# Patient Record
Sex: MALE
[De-identification: names, ages, dates, MRNs, and addresses within clinical notes are randomized per-mention and may not be internally consistent; named-entity substitution may affect disease eponyms.]

---

## 2017-10-09 ENCOUNTER — HOSPITAL ENCOUNTER (EMERGENCY)
Dept: HOSPITAL 31 - C.ER | Age: 32
Discharge: HOME | End: 2017-10-09
Payer: SELF-PAY

## 2017-10-09 VITALS — RESPIRATION RATE: 16 BRPM

## 2017-10-09 VITALS — HEART RATE: 96 BPM | SYSTOLIC BLOOD PRESSURE: 100 MMHG | DIASTOLIC BLOOD PRESSURE: 64 MMHG

## 2017-10-09 VITALS — TEMPERATURE: 97.4 F | OXYGEN SATURATION: 99 %

## 2017-10-09 VITALS — BODY MASS INDEX: 27.5 KG/M2

## 2017-10-09 DIAGNOSIS — S20.212A: Primary | ICD-10-CM
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
## 2017-10-09 LAB
ALBUMIN/GLOB SERPL: 1.3 {RATIO} (ref 1–2.1)
ALP SERPL-CCNC: 91 U/L (ref 38–126)
ALT SERPL-CCNC: 42 U/L (ref 21–72)
AST SERPL-CCNC: 35 U/L (ref 17–59)
BASOPHILS # BLD AUTO: 0.2 K/UL (ref 0–0.2)
BASOPHILS NFR BLD: 1.7 % (ref 0–2)
BILIRUB SERPL-MCNC: 0.9 MG/DL (ref 0.2–1.3)
BUN SERPL-MCNC: 9 MG/DL (ref 9–20)
CALCIUM SERPL-MCNC: 9.5 MG/DL (ref 8.6–10.4)
CHLORIDE SERPL-SCNC: 106 MMOL/L (ref 98–107)
CO2 SERPL-SCNC: 20 MMOL/L (ref 22–30)
EOSINOPHIL # BLD AUTO: 0 K/UL (ref 0–0.7)
EOSINOPHIL NFR BLD: 0.3 % (ref 0–4)
EOSINOPHIL NFR BLD: 1 % (ref 0–4)
ERYTHROCYTE [DISTWIDTH] IN BLOOD BY AUTOMATED COUNT: 13.3 % (ref 11.5–14.5)
ETHANOL SERPL-MCNC: 231 MG/DL (ref 0–10)
GLOBULIN SER-MCNC: 3.8 GM/DL (ref 2.2–3.9)
GLUCOSE SERPL-MCNC: 101 MG/DL (ref 75–110)
HCT VFR BLD CALC: 45.5 % (ref 35–51)
LYMPHOCYTES # BLD AUTO: 0.9 K/UL (ref 1–4.3)
LYMPHOCYTES NFR BLD AUTO: 8.2 % (ref 20–40)
MCH RBC QN AUTO: 29.6 PG (ref 27–31)
MCHC RBC AUTO-ENTMCNC: 34.8 G/DL (ref 33–37)
MCV RBC AUTO: 85.1 FL (ref 80–94)
MONOCYTES # BLD: 0.5 K/UL (ref 0–0.8)
MONOCYTES NFR BLD: 4.6 % (ref 0–10)
NEUTROPHILS NFR BLD AUTO: 84 % (ref 50–75)
NRBC BLD AUTO-RTO: 0.1 % (ref 0–2)
PLATELET # BLD: 180 K/UL (ref 130–400)
PMV BLD AUTO: 7.8 FL (ref 7.2–11.7)
POTASSIUM SERPL-SCNC: 3.2 MMOL/L (ref 3.6–5.2)
PROT SERPL-MCNC: 8.6 G/DL (ref 6.3–8.3)
SODIUM SERPL-SCNC: 141 MMOL/L (ref 132–148)
TOTAL CELLS COUNTED BLD: 100
WBC # BLD AUTO: 11.3 K/UL (ref 4.8–10.8)

## 2017-10-09 PROCEDURE — 74177 CT ABD & PELVIS W/CONTRAST: CPT

## 2017-10-09 PROCEDURE — 96374 THER/PROPH/DIAG INJ IV PUSH: CPT

## 2017-10-09 PROCEDURE — 96361 HYDRATE IV INFUSION ADD-ON: CPT

## 2017-10-09 PROCEDURE — 85025 COMPLETE CBC W/AUTO DIFF WBC: CPT

## 2017-10-09 PROCEDURE — 99284 EMERGENCY DEPT VISIT MOD MDM: CPT

## 2017-10-09 PROCEDURE — 80053 COMPREHEN METABOLIC PANEL: CPT

## 2017-10-09 PROCEDURE — 83690 ASSAY OF LIPASE: CPT

## 2017-10-09 NOTE — C.PDOC
History Of Present Illness





33 y/o male c/o LUQ abdominal pain that began today. Patient admits to alcohol 

abuse. Has conflicting stories that he was brought in by EMS for sleeping 

outside or called EMS for abdominal pain. Denies fever, or chills. No chest 

pain or SOB.





Time Seen by Provider: 10/09/17 02:58


Chief Complaint (Nursing): Substance Abuse


History Per: Patient


History/Exam Limitations: no limitations


Onset/Duration Of Symptoms: Hrs


Current Symptoms Are (Timing): Still Present


Suicide/Self Injury Attempted (Context): None


Modifying Factor(s): Alcohol


Severity: Mild


Associated Symptoms: denies: Suicidal Thoughts, Suicidal Plan





Past Medical History


Reviewed: Historical Data, Nursing Documentation, Vital Signs


Vital Signs: 


 Last Vital Signs











Temp  97.4 F L  10/09/17 05:35


 


Pulse  81   10/09/17 05:35


 


Resp  16   10/09/17 05:35


 


BP  95/56 L  10/09/17 05:35


 


Pulse Ox  99   10/09/17 05:59














- Medical History


PMH: Hypercholesterolemia


Family History: States: Unknown Family Hx





- Social History


Hx Alcohol Use: Yes


Hx Substance Use: No





Review Of Systems


Except As Marked, All Systems Reviewed And Found Negative.


Constitutional: Positive for: Other (Alcohol intoxicated).  Negative for: Fever

, Chills


Cardiovascular: Negative for: Chest Pain


Respiratory: Negative for: Shortness of Breath


Gastrointestinal: Positive for: Abdominal Pain


Psych: Negative for: Suicidal ideation, Other (Homicidal ideation)





Physical Exam





- Physical Exam


Appears: Non-toxic, No Acute Distress, Other (ETOH intoxicated, (+) AOB. No 

signs of trauma)


Skin: Warm, Dry


Head: Atraumatic, Normacephalic


Chest: Symmetrical


Cardiovascular: Rhythm Regular, No Murmur


Respiratory: Normal Breath Sounds, No Rales, No Rhonchi, No Wheezing


Gastrointestinal/Abdominal: Soft, Tenderness (LUQ, left lower ribs, 

midclavicular line)


Neurological/Psych: Oriented x3





ED Course And Treatment





- Laboratory Results


Result Diagrams: 


 10/09/17 03:17





 10/09/17 03:17


Lab Interpretation: Abnormal (ETOH 231 H, mild elev leukocytes)


O2 Sat by Pulse Oximetry: 99 (RA)


Pulse Ox Interpretation: Normal





- CT Scan/US


  ** abd/pelvis with IV


Other Rad Studies (CT/US): Interpreted By Me, Read By Radiologist, Radiology 

Report Reviewed (neg)


CT/US Interpretation: IMPRESSION:  No acute findings.


Progress Note: IVF/toradol IV


Reevaluation Time: 05:57


Reassessment Condition: Improved (sleeping comfortably)





Medical Decision Making


Medical Decision Making: 





initially concerned for intoxicated/obtunded pt on street with LUQ pain and 

tenderness for ? splenic injury, but CT abd/pelvis normal 


tenderness is more lower L rib/costochondral


NSAIDS and ice therapy educated.





Disposition


Doctor Will See Patient In The: Office


Counseled Patient/Family Regarding: Studies Performed, Diagnosis





- Disposition


Referrals: 


Alcoholics Anonymous [Outside]


[facility] [Outside]


[facility] [Outside]


[facility] [Outside]


Price Sepulveda MD [Staff Provider] - 


Disposition: HOME/ ROUTINE


Disposition Time: 05:58


Condition: GOOD


Additional Instructions: 


sigue con hielo encima del las claudette doloridos.


Ibuprofeno 400-600 mg cada 6 horas mati necessario





Genna de abusar el alcohol





Instructions:  Costochondritis (ED)


Forms:  [facility] (English)


Print Language: Divehi





- Clinical Impression


Clinical Impression: 


 Contusion of rib on left side








- Scribe Statement


The provider has reviewed the documentation as recorded by the Scribe





Nalini browning





All medical record entries made by the Scribe were at my direction and 

personally dictated by me. I have reviewed the chart and agree that the record 

accurately reflects my personal performance of the history, physical exam, 

medical decision making, and the department course for this patient. I have 

also personally directed, reviewed, and agree with the discharge instructions 

and disposition.

## 2018-08-28 ENCOUNTER — HOSPITAL ENCOUNTER (EMERGENCY)
Dept: HOSPITAL 31 - C.ER | Age: 33
LOS: 1 days | Discharge: HOME | End: 2018-08-29
Payer: SELF-PAY

## 2018-08-28 VITALS — BODY MASS INDEX: 27.5 KG/M2
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
## 2018-08-28 DIAGNOSIS — F10.10: Primary | ICD-10-CM

## 2018-08-28 LAB
ALBUMIN SERPL-MCNC: 5.3 G/DL (ref 3.5–5)
ALBUMIN/GLOB SERPL: 1.7 {RATIO} (ref 1–2.1)
ALT SERPL-CCNC: 68 U/L (ref 21–72)
ARTERIAL BLOOD GAS HEMOGLOBIN: 13.9 G/DL (ref 11.7–17.4)
ARTERIAL BLOOD GAS O2 SAT: 96.5 % (ref 95–98)
ARTERIAL BLOOD GAS PCO2: 35 MM/HG (ref 35–45)
ARTERIAL BLOOD GAS TCO2: 24.3 MMOL/L (ref 22–28)
ARTERIAL PATENCY WRIST A: (no result)
AST SERPL-CCNC: 54 U/L (ref 17–59)
BASOPHILS # BLD AUTO: 0.1 K/UL (ref 0–0.2)
BASOPHILS NFR BLD: 0.6 % (ref 0–2)
BUN SERPL-MCNC: 11 MG/DL (ref 9–20)
CALCIUM SERPL-MCNC: 9.5 MG/DL (ref 8.6–10.4)
EOSINOPHIL # BLD AUTO: 0 K/UL (ref 0–0.7)
EOSINOPHIL NFR BLD: 0.1 % (ref 0–4)
ERYTHROCYTE [DISTWIDTH] IN BLOOD BY AUTOMATED COUNT: 13.2 % (ref 11.5–14.5)
GFR NON-AFRICAN AMERICAN: > 60
HCO3 BLDA-SCNC: 24.4 MMOL/L (ref 21–28)
HGB BLD-MCNC: 15.3 G/DL (ref 12–18)
INHALED O2 CONCENTRATION: 21 %
LIPASE: 59 U/L (ref 23–300)
LYMPHOCYTES # BLD AUTO: 1.9 K/UL (ref 1–4.3)
LYMPHOCYTES NFR BLD AUTO: 13.4 % (ref 20–40)
MCH RBC QN AUTO: 30.1 PG (ref 27–31)
MCHC RBC AUTO-ENTMCNC: 34.4 G/DL (ref 33–37)
MCV RBC AUTO: 87.6 FL (ref 80–94)
MONOCYTES # BLD: 0.9 K/UL (ref 0–0.8)
MONOCYTES NFR BLD: 6.5 % (ref 0–10)
NEUTROPHILS # BLD: 11 K/UL (ref 1.8–7)
NEUTROPHILS NFR BLD AUTO: 79.4 % (ref 50–75)
NRBC BLD AUTO-RTO: 0 % (ref 0–2)
PH BLDA: 7.43 [PH] (ref 7.35–7.45)
PLATELET # BLD: 237 K/UL (ref 130–400)
PMV BLD AUTO: 6.8 FL (ref 7.2–11.7)
PO2 BLDA: 74 MM/HG (ref 80–100)
RBC # BLD AUTO: 5.08 MIL/UL (ref 4.4–5.9)
WBC # BLD AUTO: 13.8 K/UL (ref 4.8–10.8)

## 2018-08-28 PROCEDURE — 80053 COMPREHEN METABOLIC PANEL: CPT

## 2018-08-28 PROCEDURE — 96375 TX/PRO/DX INJ NEW DRUG ADDON: CPT

## 2018-08-28 PROCEDURE — 96361 HYDRATE IV INFUSION ADD-ON: CPT

## 2018-08-28 PROCEDURE — 96365 THER/PROPH/DIAG IV INF INIT: CPT

## 2018-08-28 PROCEDURE — 82948 REAGENT STRIP/BLOOD GLUCOSE: CPT

## 2018-08-28 PROCEDURE — 83690 ASSAY OF LIPASE: CPT

## 2018-08-28 PROCEDURE — 99285 EMERGENCY DEPT VISIT HI MDM: CPT

## 2018-08-28 PROCEDURE — 85025 COMPLETE CBC W/AUTO DIFF WBC: CPT

## 2018-08-28 PROCEDURE — 82803 BLOOD GASES ANY COMBINATION: CPT

## 2018-08-28 PROCEDURE — 81001 URINALYSIS AUTO W/SCOPE: CPT

## 2018-08-28 NOTE — C.PDOC
History Of Present Illness





Patient seen tonite due to history of alcohol intKE FOR TE PAST 3- 4 DAYS. lAST 

ALCOHOL INTAKE WAS THIS AFTERNOON. COMPLAINING IF STIFFNESS OF BOTH HANDS.


Chief Complaint (Nursing): Substance Abuse


History Per: Patient


History/Exam Limitations: no limitations


Onset/Duration Of Symptoms: Days


Current Symptoms Are (Timing): Still Present


Suicide/Self Injury Attempted (Context): None


Modifying Factor(s): Alcohol


Severity: Mild


Pain Scale Rating Of: 2


Associated Symptoms: Agitation


Involuntary Hold By: None


Recent travel outside of the [place]: No


Additional History Per: Patient





Past Medical History


Vital Signs: 


 Last Vital Signs











Temp  98.8 F   08/28/18 21:39


 


Pulse  88   08/28/18 23:43


 


Resp  16   08/28/18 23:43


 


BP  130/74   08/28/18 23:43


 


Pulse Ox  96   08/28/18 21:59














- Medical History


PMH: Hypercholesterolemia


Other PMH: ALCOHOL ABUSE


Surgical History: No Surg Hx


Family History: States: Unknown Family Hx





- Social History


Hx Alcohol Use: Yes


Hx Substance Use: No





- Immunization History


Hx Tetanus Toxoid Vaccination: No


Hx Influenza Vaccination: No


Hx Pneumococcal Vaccination: No





Review Of Systems


Constitutional: Negative for: Fever, Chills, Sweats


Eyes: Negative for: Pain, Vision Change, Conjunctivae Inflammation


ENT: Negative for: Ear Pain, Ear Discharge, Nose Pain, Nose Discharge


Cardiovascular: Negative for: Chest Pain, Palpitations, Orthopnea, Paroxysmal 

Noc. Dyspnea


Respiratory: Negative for: Cough, Shortness of Breath, Hemoptysis


Gastrointestinal: Positive for: Abdominal Pain (epigastric area).  Negative for

: Nausea, Vomiting


Genitourinary: Negative for: Dysuria, Frequency, Incontinence


Musculoskeletal: Negative for: Neck Pain, Shoulder Pain, Arm Pain


Skin: Negative for: Rash


Neurological: Negative for: Weakness, Numbness, Incoordination, Change in Speech


Psych: Negative for: Anxiety, Depression





Physical Exam





- Physical Exam


Appears: Non-toxic, No Acute Distress


Skin: Normal Color


Head: Atraumatic, Normacephalic, No Tenderness


Eye(s): bilateral: Normal Inspection, PERRL, EOMI


Nose: Normal


Oral Mucosa: Moist, No Dry


Tongue: Normal Appearing


Lips: Normal Appearing


Throat: Normal


Neck: Normal


Chest: Symmetrical, No Deformity, No Tenderness, No Ecchymosis, No Subcutaneous 

Emphysema


Respiratory: Normal Breath Sounds


Gastrointestinal/Abdominal: Tenderness (mild tenderness), No Organomegaly, No 

Mass, No Distention, No Guarding, No Rebound


Back: Normal Inspection


Extremity: Other (carpal spasm)





ED Course And Treatment





- Laboratory Results


Result Diagrams: 


 08/28/18 21:57





 08/28/18 21:57


O2 Sat by Pulse Oximetry: 96





Disposition


Counseled Patient/Family Regarding: Diagnosis





- Disposition


Referrals: 


Non [facility] Provider, [Non-Staff] - 


[facility] [Outside]


Disposition: HOME/ ROUTINE


Disposition Time: 02:47


Condition: STABLE


Prescriptions: 


chlordiazePOXIDE [Chlordiazepoxide HCl] 10 mg PO Q8 #10 cap


Instructions:  Alcohol Abuse and Alcoholism (DC)


Forms:  [facility] (English), Gen Discharge Inst Russian


Print Language: Lao





- POA


Present On Arrival: None





- Clinical Impression


Clinical Impression: 


 Alcohol abuse

## 2018-08-29 VITALS
SYSTOLIC BLOOD PRESSURE: 110 MMHG | TEMPERATURE: 98 F | RESPIRATION RATE: 20 BRPM | DIASTOLIC BLOOD PRESSURE: 71 MMHG | HEART RATE: 87 BPM | OXYGEN SATURATION: 100 %

## 2018-08-29 LAB
BILIRUB UR-MCNC: NEGATIVE MG/DL
GLUCOSE UR STRIP-MCNC: NORMAL MG/DL
LEUKOCYTE ESTERASE UR-ACNC: (no result) LEU/UL
PH UR STRIP: 7 [PH] (ref 5–8)
PROT UR STRIP-MCNC: NEGATIVE MG/DL
RBC # UR STRIP: NEGATIVE /UL
SP GR UR STRIP: 1.01 (ref 1–1.03)
SQUAMOUS EPITHIAL: < 1 /HPF (ref 0–5)
UROBILINOGEN UR-MCNC: NORMAL MG/DL (ref 0.2–1)